# Patient Record
Sex: MALE | URBAN - METROPOLITAN AREA
[De-identification: names, ages, dates, MRNs, and addresses within clinical notes are randomized per-mention and may not be internally consistent; named-entity substitution may affect disease eponyms.]

---

## 2022-07-19 ENCOUNTER — OFFICE VISIT (OUTPATIENT)
Dept: URBAN - METROPOLITAN AREA CLINIC 6 | Facility: CLINIC | Age: 52
End: 2022-07-19

## 2022-07-19 DIAGNOSIS — H44.001 UNSPECIFIED PURULENT ENDOPHTHALMITIS, RIGHT EYE: Primary | ICD-10-CM

## 2022-07-19 DIAGNOSIS — E11.9 DIABETES MELLITUS TYPE 2 WITHOUT MENTION OF COMPLICATION: ICD-10-CM

## 2022-07-19 DIAGNOSIS — H25.813 COMBINED FORMS OF AGE-RELATED CATARACT, BILATERAL: ICD-10-CM

## 2022-07-19 PROCEDURE — 92004 COMPRE OPH EXAM NEW PT 1/>: CPT | Performed by: OPTOMETRIST

## 2022-07-19 ASSESSMENT — INTRAOCULAR PRESSURE
OS: 15
OD: 7

## 2022-07-19 NOTE — IMPRESSION/PLAN
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 Plan: Referral for Possible endophthalmitis Right eye- Symptoms began on Sunday 7-. Pt was given Ceftriaxone 1 gm IVP @ 08:24 am & Moxifloxacin 400mg IVPB @ 07:58 am on 7/19/22 at [address] Pt is able to open eye more since the morning. No underlying autoimmune disorders and pt is a Type 2 DM with BS level on 7/19/22 at 227. Pt states he has not checked BS in some time. No recent dental infections/dental work/or general surgeries. Pt states eye is very painful and prominent 2+ hypopyon. Pt was diagnosed in the ER with a UTI today and was prescribed medications Keflex 500 mg PO q12 hours for 7 days. Pt states having numb sensation on his left side since 2007 from a back injury (Sx in 2017), but did state he has begun to have this affect his right side ~ 5 months ago. Note pt on Eliquis and baby ASN. MHX of heart stent in 2021. EKG WNL and CT head/brain without contrast WNL as per ER discharge papers. NOTE: Pt is self pay and does not go to [facility] (can't refer to Dr Dav King per pt does not travel to [facility]). Advised on how to obtain Health care options [facility] discount to then see Retina at [facility]. Will proceed to refer to [facility] ASAP. Per pt he is having trouble with insurance provided at work due to a union conflict, therefore has no insurance at the moment. You are being referred to the physicians at   [facility] 	Dr. Dorie Farr. Debbie Keita. Chano Chris Address: [address] [facility], [address]   [phone] 	     [address] [place] [address], [facility]. [address] [place] [address]  [phone] 	     [place] [address], [address] [place] [address], [address] [place] [address]  [phone] Please contact one of the offices listed above to arrange an appointment. NOTE:  Some insurance providers require a REFERRAL FROM YOUR PRIMARY CARE DOCTOR. Please contact your insurance company in order to see if this is the policy. If so, contact your primary care doctor in order to obtain authorization. If you have any questions, or need any further assistance, please contact our office at [phone].

## 2022-07-19 NOTE — IMPRESSION/PLAN
Impression: Diabetes mellitus Type 2 without mention of complication: N58.4. Plan: DM Type 2 Without complications- Discussed the pathophysiology of diabetes and its effect on the eye. Stressed the importance of strong glucose control. Advised of importance of scheduled dilated examinations, and to contact us immediately for any problems or concerns.

## 2022-07-19 NOTE — IMPRESSION/PLAN
Impression: Combined forms of age-related cataract, bilateral: H25.813. Plan: Cataract Both eyes- Observe for now without intervention. The patient was advised to contact us if any change or worsening of vision.